# Patient Record
Sex: MALE | Race: WHITE | NOT HISPANIC OR LATINO | Employment: OTHER | URBAN - METROPOLITAN AREA
[De-identification: names, ages, dates, MRNs, and addresses within clinical notes are randomized per-mention and may not be internally consistent; named-entity substitution may affect disease eponyms.]

---

## 2021-01-22 ENCOUNTER — TRANSFERRED RECORDS (OUTPATIENT)
Dept: HEALTH INFORMATION MANAGEMENT | Facility: CLINIC | Age: 81
End: 2021-01-22

## 2021-01-26 NOTE — PROGRESS NOTES
Ordering Clinic: [facility]  Procedure: neph tube placement, left  Arrival date: 2/1/21  Arrival time: 800  Covid-19 testing requirements explained: yes  NPO explained: yes                Does patient have transportation available pre and post procedure?   yes  Check-in procedure explained: yes  Allergies reviewed: y  Blood thinners: eliquis to be held 48 hours  Labs: need  H&P:  With order  Does patient require ?   no  If so, language?      services called to order ?    date requested:    arrival time requested:    Name of individual from  services assisting with scheduling appointment:    Previous sedation:  Last oral intake:    solid: ________  Liquids: _______

## 2021-01-27 DIAGNOSIS — Z11.59 ENCOUNTER FOR SCREENING FOR OTHER VIRAL DISEASES: Primary | ICD-10-CM

## 2021-02-05 ENCOUNTER — HOSPITAL ENCOUNTER (OUTPATIENT)
Dept: LAB | Facility: CLINIC | Age: 81
Discharge: HOME | End: 2021-02-05
Attending: UROLOGY | Admitting: UROLOGY
Payer: COMMERCIAL

## 2021-02-05 DIAGNOSIS — Z11.59 ENCOUNTER FOR SCREENING FOR OTHER VIRAL DISEASES: ICD-10-CM

## 2021-02-05 LAB
SARS-COV-2 RNA RESP QL NAA+PROBE: NORMAL
SPECIMEN SOURCE: NORMAL

## 2021-02-05 PROCEDURE — U0003 INFECTIOUS AGENT DETECTION BY NUCLEIC ACID (DNA OR RNA); SEVERE ACUTE RESPIRATORY SYNDROME CORONAVIRUS 2 (SARS-COV-2) (CORONAVIRUS DISEASE [COVID-19]), AMPLIFIED PROBE TECHNIQUE, MAKING USE OF HIGH THROUGHPUT TECHNOLOGIES AS DESCRIBED BY CMS-2020-01-R: HCPCS | Performed by: UROLOGY

## 2021-02-05 PROCEDURE — U0005 INFEC AGEN DETEC AMPLI PROBE: HCPCS | Performed by: UROLOGY

## 2021-02-06 LAB
LABORATORY COMMENT REPORT: NORMAL
SARS-COV-2 RNA RESP QL NAA+PROBE: NEGATIVE
SPECIMEN SOURCE: NORMAL

## 2021-02-08 ENCOUNTER — APPOINTMENT (OUTPATIENT)
Dept: INTERVENTIONAL RADIOLOGY/VASCULAR | Facility: CLINIC | Age: 81
End: 2021-02-08
Attending: UROLOGY
Payer: COMMERCIAL

## 2021-02-08 ENCOUNTER — HOSPITAL ENCOUNTER (OUTPATIENT)
Facility: CLINIC | Age: 81
Discharge: HOME | End: 2021-02-08
Attending: RADIOLOGY | Admitting: RADIOLOGY
Payer: COMMERCIAL

## 2021-02-08 VITALS
HEART RATE: 103 BPM | OXYGEN SATURATION: 90 % | SYSTOLIC BLOOD PRESSURE: 118 MMHG | DIASTOLIC BLOOD PRESSURE: 68 MMHG | TEMPERATURE: 97.9 F | RESPIRATION RATE: 16 BRPM

## 2021-02-08 DIAGNOSIS — N13.5 STRICTURE OF URETER: ICD-10-CM

## 2021-02-08 LAB
BASOPHILS # BLD AUTO: 0 10E9/L (ref 0–0.2)
BASOPHILS NFR BLD AUTO: 0.2 %
CREAT SERPL-MCNC: 1.87 MG/DL (ref 0.66–1.25)
DIFFERENTIAL METHOD BLD: ABNORMAL
EOSINOPHIL # BLD AUTO: 0.5 10E9/L (ref 0–0.7)
EOSINOPHIL NFR BLD AUTO: 3.8 %
ERYTHROCYTE [DISTWIDTH] IN BLOOD BY AUTOMATED COUNT: 19.8 % (ref 10–15)
GFR SERPL CREATININE-BSD FRML MDRD: 33 ML/MIN/{1.73_M2}
HCT VFR BLD AUTO: 43.6 % (ref 40–53)
HGB BLD-MCNC: 12.9 G/DL (ref 13.3–17.7)
IMM GRANULOCYTES # BLD: 0.1 10E9/L (ref 0–0.4)
IMM GRANULOCYTES NFR BLD: 0.7 %
LYMPHOCYTES # BLD AUTO: 1.3 10E9/L (ref 0.8–5.3)
LYMPHOCYTES NFR BLD AUTO: 10.2 %
MCH RBC QN AUTO: 28.9 PG (ref 26.5–33)
MCHC RBC AUTO-ENTMCNC: 29.6 G/DL (ref 31.5–36.5)
MCV RBC AUTO: 98 FL (ref 78–100)
MONOCYTES # BLD AUTO: 1 10E9/L (ref 0–1.3)
MONOCYTES NFR BLD AUTO: 8 %
NEUTROPHILS # BLD AUTO: 10 10E9/L (ref 1.6–8.3)
NEUTROPHILS NFR BLD AUTO: 77.1 %
NRBC # BLD AUTO: 0 10*3/UL
NRBC BLD AUTO-RTO: 0 /100
PLATELET # BLD AUTO: 443 10E9/L (ref 150–450)
RBC # BLD AUTO: 4.47 10E12/L (ref 4.4–5.9)
WBC # BLD AUTO: 12.9 10E9/L (ref 4–11)

## 2021-02-08 PROCEDURE — 50432 PLMT NEPHROSTOMY CATHETER: CPT

## 2021-02-08 PROCEDURE — 85730 THROMBOPLASTIN TIME PARTIAL: CPT | Performed by: RADIOLOGY

## 2021-02-08 PROCEDURE — 272N000508 HC NEEDLE CR8

## 2021-02-08 PROCEDURE — C1729 CATH, DRAINAGE: HCPCS

## 2021-02-08 PROCEDURE — 250N000009 HC RX 250: Performed by: RADIOLOGY

## 2021-02-08 PROCEDURE — 272N000504 HC NEEDLE CR4

## 2021-02-08 PROCEDURE — C1769 GUIDE WIRE: HCPCS

## 2021-02-08 PROCEDURE — 85025 COMPLETE CBC W/AUTO DIFF WBC: CPT | Performed by: RADIOLOGY

## 2021-02-08 PROCEDURE — 999N000127 HC STATISTIC PERIPHERAL IV START W US GUIDANCE

## 2021-02-08 PROCEDURE — 250N000011 HC RX IP 250 OP 636: Performed by: RADIOLOGY

## 2021-02-08 PROCEDURE — 76937 US GUIDE VASCULAR ACCESS: CPT

## 2021-02-08 PROCEDURE — 36592 COLLECT BLOOD FROM PICC: CPT

## 2021-02-08 PROCEDURE — 272N000196 HC ACCESSORY CR5

## 2021-02-08 PROCEDURE — 82565 ASSAY OF CREATININE: CPT | Performed by: RADIOLOGY

## 2021-02-08 PROCEDURE — 50430 NJX PX NFROSGRM &/URTRGRM: CPT

## 2021-02-08 PROCEDURE — 250N000011 HC RX IP 250 OP 636

## 2021-02-08 PROCEDURE — 99152 MOD SED SAME PHYS/QHP 5/>YRS: CPT

## 2021-02-08 RX ORDER — LIDOCAINE 40 MG/G
CREAM TOPICAL
Status: DISCONTINUED | OUTPATIENT
Start: 2021-02-08 | End: 2021-02-08 | Stop reason: HOSPADM

## 2021-02-08 RX ORDER — CEFTAZIDIME 1 G/1
1 INJECTION, POWDER, FOR SOLUTION INTRAMUSCULAR; INTRAVENOUS
Status: COMPLETED | OUTPATIENT
Start: 2021-02-08 | End: 2021-02-08

## 2021-02-08 RX ORDER — FENTANYL CITRATE 50 UG/ML
25-50 INJECTION, SOLUTION INTRAMUSCULAR; INTRAVENOUS EVERY 5 MIN PRN
Status: DISCONTINUED | OUTPATIENT
Start: 2021-02-08 | End: 2021-02-08 | Stop reason: HOSPADM

## 2021-02-08 RX ORDER — LIDOCAINE HYDROCHLORIDE 10 MG/ML
1-30 INJECTION, SOLUTION EPIDURAL; INFILTRATION; INTRACAUDAL; PERINEURAL
Status: DISCONTINUED | OUTPATIENT
Start: 2021-02-08 | End: 2021-02-08 | Stop reason: HOSPADM

## 2021-02-08 RX ORDER — DEXTROSE MONOHYDRATE 25 G/50ML
25-50 INJECTION, SOLUTION INTRAVENOUS
Status: CANCELLED | OUTPATIENT
Start: 2021-02-08

## 2021-02-08 RX ORDER — NICOTINE POLACRILEX 4 MG
15-30 LOZENGE BUCCAL
Status: DISCONTINUED | OUTPATIENT
Start: 2021-02-08 | End: 2021-02-08 | Stop reason: HOSPADM

## 2021-02-08 RX ORDER — FENTANYL CITRATE 50 UG/ML
INJECTION, SOLUTION INTRAMUSCULAR; INTRAVENOUS
Status: COMPLETED
Start: 2021-02-08 | End: 2021-02-08

## 2021-02-08 RX ORDER — FLUMAZENIL 0.1 MG/ML
0.2 INJECTION, SOLUTION INTRAVENOUS
Status: DISCONTINUED | OUTPATIENT
Start: 2021-02-08 | End: 2021-02-08 | Stop reason: HOSPADM

## 2021-02-08 RX ORDER — DEXTROSE MONOHYDRATE 25 G/50ML
25-50 INJECTION, SOLUTION INTRAVENOUS
Status: DISCONTINUED | OUTPATIENT
Start: 2021-02-08 | End: 2021-02-08 | Stop reason: HOSPADM

## 2021-02-08 RX ORDER — NALOXONE HYDROCHLORIDE 0.4 MG/ML
0.2 INJECTION, SOLUTION INTRAMUSCULAR; INTRAVENOUS; SUBCUTANEOUS
Status: DISCONTINUED | OUTPATIENT
Start: 2021-02-08 | End: 2021-02-08 | Stop reason: HOSPADM

## 2021-02-08 RX ORDER — NICOTINE POLACRILEX 4 MG
15-30 LOZENGE BUCCAL
Status: CANCELLED | OUTPATIENT
Start: 2021-02-08

## 2021-02-08 RX ORDER — NALOXONE HYDROCHLORIDE 0.4 MG/ML
0.4 INJECTION, SOLUTION INTRAMUSCULAR; INTRAVENOUS; SUBCUTANEOUS
Status: DISCONTINUED | OUTPATIENT
Start: 2021-02-08 | End: 2021-02-08 | Stop reason: HOSPADM

## 2021-02-08 RX ORDER — CEFTAZIDIME 1 G/1
INJECTION, POWDER, FOR SOLUTION INTRAMUSCULAR; INTRAVENOUS
Status: COMPLETED
Start: 2021-02-08 | End: 2021-02-08

## 2021-02-08 RX ORDER — LIDOCAINE HYDROCHLORIDE 10 MG/ML
INJECTION, SOLUTION EPIDURAL; INFILTRATION; INTRACAUDAL; PERINEURAL
Status: DISCONTINUED
Start: 2021-02-08 | End: 2021-02-08 | Stop reason: HOSPADM

## 2021-02-08 RX ADMIN — FENTANYL CITRATE 25 MCG: 50 INJECTION, SOLUTION INTRAMUSCULAR; INTRAVENOUS at 11:19

## 2021-02-08 RX ADMIN — LIDOCAINE HYDROCHLORIDE 12 ML: 10 INJECTION, SOLUTION EPIDURAL; INFILTRATION; INTRACAUDAL; PERINEURAL at 11:36

## 2021-02-08 RX ADMIN — CEFTAZIDIME 1 G: 1 INJECTION, POWDER, FOR SOLUTION INTRAMUSCULAR; INTRAVENOUS at 11:07

## 2021-02-08 RX ADMIN — MIDAZOLAM 1 MG: 1 INJECTION INTRAMUSCULAR; INTRAVENOUS at 11:19

## 2021-02-08 RX ADMIN — CEFTAZIDIME 1 G: 1 INJECTION, POWDER, FOR SOLUTION INTRAMUSCULAR; INTRAVENOUS at 10:42

## 2021-02-08 NOTE — DISCHARGE INSTRUCTIONS
Discharge Instructions for Percutaneous Nephrostomy  You had a procedure called percutaneous nephrostomy. This means that urine was drained from your kidney to prevent pain, infection, and kidney damage. You had the procedure because your kidney or the tube leading from the kidney to the bladder (ureter) was blocked by a kidney stone or tumor, or perhaps due to another problem. The blockage caused a backup of urine in your kidney.  A thin, flexible tube (catheter) will stay in place until the problem that caused the buildup of urine has been treated. This may be as soon as a day or as long as weeks to months. The catheter bag is taped to your leg so that you can walk around.  Activity    Don t lift anything heavier than  10 pounds (4.5 kg) until your healthcare provider says it s OK.    Don't do any strenuous activities, such as mowing the lawn, vacuuming, playing sports, or anything that will cause your tubing to be pulled or moved.    Slowly increase your activity level with short, frequent walks  3 to 4 times a day.    Don t drive while you are still taking pain medicine. Wait until your healthcare provider says it s OK to drive.    Home care    Eat your normal diet.    Drink 8 to 12 (8-ounce) cups of liquid each day unless you were told to limit liquids because of another condition. About 30 to 60 milliliters of urine should drain into the bag each hour.    Wear loose, comfortable clothes that won t pull or kink the catheter tube.    Check your dressing often to make sure the tubing is secure.    Don t let the drainage bag hang freely, or it will pull on the catheter. Keep it secured to your leg or hold it temporarily.    Empty the drainage bag often to keep the weight of the bag from pulling on the catheter.  ? Empty the bag when it is 1/2 to 2/3 full.  ? Always empty the bag before you go to bed.  ? Wash your hands before and after emptying the bag.    Measure and record the amount and color of the urine in  the bag.    Gently clean the skin around the catheter with mild soap and warm water. Check the skin for any signs of infection. Pat dry with a clean towel.    Change your dressing if it becomes loose or dirty. Ask your healthcare provider how your skin should be cleaned and which skin barriers and attachment devices to use. Ask someone to help you care for your nephrostomy tube and follow the cleaning instructions.    Throw away the used dressing in a plastic bag.    If you were asked to stop any medicines before the surgery, ask the healthcare provider when you may start taking them again. This is especially important in the case of blood thinners (anticoagulants or antiplatelet medicines).    Follow-up care  Follow up with your healthcare provider, or as advised.  When to call your healthcare provider  Call your healthcare provider right away if you have any of these:    A catheter that is not draining    The catheter comes out. Don't try to put it back in.    The catheter partly comes out. There may be a black celia on the tube to celia the place where the tube enters the skin. Check to see that the black celia is next to the skin. If the black celia isn't next to the skin, the tube has moved. A healthcare provider needs to put it back in. Don't try to put it back in.    Pain, redness, or discharge around the catheter    Fever of  100.4 F ( 38 C) or higher, or as directed by your healthcare provider    A noticeable increase or decrease in the amount of urine that drains    Cloudy or smelly urine    Urine that changes to a pink or red color    More pain    Severe pain in your side    Nausea and vomiting    New or worsening symptoms  [employer] last reviewed this educational content on 4/1/2020 2000-2020 [employer]. [address] [place] [address], [place] [address]. All rights reserved. This information is not intended as a substitute for professional medical care. Always follow your healthcare professional's  instructions.        Invasive Radiology Procedures Discharge Instructions         _____________________________________________________________________    Patient Name:  Rogelio Trejo  Today's Date:  February 8, 2021    The doctor who did your procedure today was Dr. Emerson.    Procedure:  Nephrostomy Tube       If you have not received your results after 5 days, please call the doctor who ordered your test.  Diet and medicines    Go back to your normal diet.    You may start taking your normal medicines again (including Coumadin, or warfarin), as shown on your medicine sheet.    If you take aspirin, Plavix or other anti-platelet drugs: Start taking it tomorrow.    If take Coumadin (warfarin): Ask your doctor when to have your INR checked.    For minor pain, you may take Tylenol (acetaminophen) or Advil (ibuprofen).    Activity and puncture site     You may go back to normal activity in 24 hours. Wait 48 hours before lifting,   straining, exercise or other strenuous activity.    For the next day or two, check your puncture site often while you are awake.    Change the Band-Aid or bandage tomorrow.    You may shower tomorrow morning. No bathing or swimming until your   puncture site has fully healed.    If you received IV medicine to sedate you:  You were given: Versed  and Fentanyl  You may feel drowsy, forgetful or unsteady. For the next 24 hours, do not drive, drink alcohol or make any important decisions.    Know when to call for help  Call your doctor if you have:    A fever greater over 101 F (38.3 C), taken under the tongue.    A lot of bleeding or swelling at your puncture site.    Pain that is getting worse.     Shortness of breath.  Call 911 or go the emergency room if you have:    Severe chest pain or trouble breathing.    A tube that falls out.     Increased blood in your sputum (phlegm).    Bleeding that you cannot control.   Important phone numbers:  [facility] at [phone]

## 2021-02-08 NOTE — PRE-PROCEDURE
GENERAL PRE-PROCEDURE:   Procedure:  Left nephrostomy tube placement.   Date/Time:  2/8/2021 9:41 AM    Verbal consent obtained?: Yes    Written consent obtained?: Yes    Risks and benefits: Risks, benefits and alternatives were discussed    Consent given by:  Patient  Patient states understanding of procedure being performed: Yes    Patient's understanding of procedure matches consent: Yes    Procedure consent matches procedure scheduled: Yes    Expected level of sedation:  Moderate  Appropriately NPO:  Yes  ASA Class:  Class 2- mild systemic disease, no acute problems, no functional limitations  Mallampati  :  Grade 2- soft palate, base of uvula, tonsillar pillars, and portion of posterior pharyngeal wall visible  Lungs:  Lungs clear with good breath sounds bilaterally  Heart:  Normal heart sounds and rate  History & Physical reviewed:  History and physical reviewed and no updates needed  Statement of review:  I have reviewed the lab findings, diagnostic data, medications, and the plan for sedation

## 2021-02-08 NOTE — SEDATION DOCUMENTATION
Post Procedure Summary:  Prior to the start of the procedure and with procedural staff participation, I verbally confirmed the patient s identity using two indicators, relevant allergies, that the procedure was appropriate and matched the consent or emergent situation, and that the correct equipment/implants were available. Immediately prior to starting the procedure I conducted the Time Out with the procedural staff and re-confirmed the patient s name, procedure, and site/side. (The Joint Commission universal protocol was followed.)  Yes       Sedatives: Fentanyl and Midazolam (Versed)    Vital signs, airway and pulse oximetry were monitored and remained stable throughout the procedure and sedation was maintained until the procedure was complete.  The patient was monitored by staff until sedation discharge criteria were met. Pt's IV was infiltrated upon arrival to OR. MD started central line in room for abx and sedation.     Patient tolerance: Patient tolerated the procedure well with no immediate complications.    Time of sedation in minutes: 20 minutes from beginning to end of physician one to one monitoring.

## 2021-02-08 NOTE — IP AVS SNAPSHOT
[facility]  [address]  [facility] [address]  Phone: [phone]                                    After Visit Summary   2/8/2021    Rogelio Trejo    MRN: [id number]           After Visit Summary Signature Page    I have received my discharge instructions, and my questions have been answered. I have discussed any challenges I see with this plan with the nurse or doctor.    ..........................................................................................................................................  Patient/Patient Representative Signature      ..........................................................................................................................................  Patient Representative Print Name and Relationship to Patient    ..................................................               ................................................  Date                                   Time    ..........................................................................................................................................  Reviewed by Signature/Title    ...................................................              ..............................................  Date                                               Time          22EPIC Rev 08/18

## 2021-02-08 NOTE — PROCEDURES
[facility]    Procedure: Left nephrostomy tube placement.     Date/Time: 2/8/2021 11:41 AM  Performed by: Cheryl Emerson DO  Authorized by: Cheryl Emerson DO     [employer]   Site Marked: Yes  Prior Images Obtained and Reviewed:  Yes  Required items: Required blood products, implants, devices and special equipment available    Patient identity confirmed:  Verbally with patient, arm band, provided demographic data and hospital-assigned identification number  Patient was reevaluated immediately before administering moderate or deep sedation or anesthesia  Confirmation Checklist:  Patient's identity using two indicators, relevant allergies, procedure was appropriate and matched the consent or emergent situation and correct equipment/implants were available  Time out: Immediately prior to the procedure a time out was called    Universal Protocol: the Joint Commission Universal Protocol was followed    Preparation: Patient was prepped and draped in usual sterile fashion           ANESTHESIA    Anesthesia: Local infiltration  Local Anesthetic:  Lidocaine 1% without epinephrine      SEDATION    Patient Sedated: Yes    Sedation Type:  Moderate (conscious) sedation  Vital signs: Vital signs monitored during sedation    See dictated procedure note for full details.  Findings: Left nephrostomy tube placement.     Specimens: none    Complications: None    Condition: Stable    PROCEDURE   Patient Tolerance:  Patient tolerated the procedure well with no immediate complications    Length of time physician/provider present for 1:1 monitoring during sedation: 20

## 2021-02-08 NOTE — IP AVS SNAPSHOT
MRN:[id number]                      After Visit Summary   2/8/2021    Rogelio Trejo    MRN: [id number]           Visit Information        Department      2/8/2021  8:41 AM [facility]          Review of your medicines      You have not been prescribed any medications.           Protect others around you: Learn how to safely use, store and throw away your medicines at www.disposemymeds.org.       Follow-ups after your visit       Your next 10 appointments already scheduled    Feb 08, 2021 10:00 AM  IR NEPHROSTOMY TUBE PLACEMENT LEFT with RHIR11, CATHIRTEAM, RH IR RAD  [facility] Interventional [facility]iology ([facility] ) [address] [facility] [address] [phone]   The day before the exam:    You may eat your regular diet.    You are encouraged to drink at least 8 eight ounce glasses of clear liquids.    Drink no alcoholic beverages for 24 hours before or after the exam.    The day of the exam:    Do not eat any solid food or milk products for 6 hours prior to the exam. You may drink clear liquids until 2 hours prior to the exam. Clear liquids include the following: water, Jell-O, clear broth, apple juice or any non-carbonated drink that you can see through (no pop!)    The morning of the exam you may take medications as directed with a sip of water.    Please wear loose clothing, such as a sweat suit or jogging clothes. Avoid snaps, zippers and other metal. We may ask you to undress and put on a hospital gown.    Please bring any scans or X-rays taken at other hospitals, if similar tests were done. Also bring a list of your medicines, including vitamins, minerals and over-the-counter drugs. It is safest to leave personal items at home.    Someone will need to drive you to and from the hospital.    Tell your doctor in advance:    If you have allergies to x-ray contrast or iodine.    If you are or may be pregnant.    If you are  diabetic to determine if your insulin needs have to be adjusted for the exam.    If you were given sedation, you cannot drive for 24 hours after the procedure, and an adult must be with you until then.    If you have any questions, please call the Imaging Department where you will have your exam. Directions, parking instructions, and other information are available on our website, [email or web address].        Care Instructions       Further instructions from your care team         Discharge Instructions for Percutaneous Nephrostomy  You had a procedure called percutaneous nephrostomy. This means that urine was drained from your kidney to prevent pain, infection, and kidney damage. You had the procedure because your kidney or the tube leading from the kidney to the bladder (ureter) was blocked by a kidney stone or tumor, or perhaps due to another problem. The blockage caused a backup of urine in your kidney.  A thin, flexible tube (catheter) will stay in place until the problem that caused the buildup of urine has been treated. This may be as soon as a day or as long as weeks to months. The catheter bag is taped to your leg so that you can walk around.  Activity    Don t lift anything heavier than  10 pounds (4.5 kg) until your healthcare provider says it s OK.    Don't do any strenuous activities, such as mowing the lawn, vacuuming, playing sports, or anything that will cause your tubing to be pulled or moved.    Slowly increase your activity level with short, frequent walks  3 to 4 times a day.    Don t drive while you are still taking pain medicine. Wait until your healthcare provider says it s OK to drive.    Home care    Eat your normal diet.    Drink 8 to 12 (8-ounce) cups of liquid each day unless you were told to limit liquids because of another condition. About 30 to 60 milliliters of urine should drain into the bag each hour.    Wear loose, comfortable clothes that won t pull or kink the catheter  tube.    Check your dressing often to make sure the tubing is secure.    Don t let the drainage bag hang freely, or it will pull on the catheter. Keep it secured to your leg or hold it temporarily.    Empty the drainage bag often to keep the weight of the bag from pulling on the catheter.  ? Empty the bag when it is 1/2 to 2/3 full.  ? Always empty the bag before you go to bed.  ? Wash your hands before and after emptying the bag.    Measure and record the amount and color of the urine in the bag.    Gently clean the skin around the catheter with mild soap and warm water. Check the skin for any signs of infection. Pat dry with a clean towel.    Change your dressing if it becomes loose or dirty. Ask your healthcare provider how your skin should be cleaned and which skin barriers and attachment devices to use. Ask someone to help you care for your nephrostomy tube and follow the cleaning instructions.    Throw away the used dressing in a plastic bag.    If you were asked to stop any medicines before the surgery, ask the healthcare provider when you may start taking them again. This is especially important in the case of blood thinners (anticoagulants or antiplatelet medicines).    Follow-up care  Follow up with your healthcare provider, or as advised.  When to call your healthcare provider  Call your healthcare provider right away if you have any of these:    A catheter that is not draining    The catheter comes out. Don't try to put it back in.    The catheter partly comes out. There may be a black celia on the tube to celia the place where the tube enters the skin. Check to see that the black celia is next to the skin. If the black celia isn't next to the skin, the tube has moved. A healthcare provider needs to put it back in. Don't try to put it back in.    Pain, redness, or discharge around the catheter    Fever of  100.4 F ( 38 C) or higher, or as directed by your healthcare provider    A noticeable increase or  decrease in the amount of urine that drains    Cloudy or smelly urine    Urine that changes to a pink or red color    More pain    Severe pain in your side    Nausea and vomiting    New or worsening symptoms  [employer] last reviewed this educational content on 4/1/2020 2000-2020 [employer]. [address] [place] [address]. All rights reserved. This information is not intended as a substitute for professional medical care. Always follow your healthcare professional's instructions.        Invasive Radiology Procedures Discharge Instructions         _____________________________________________________________________    Patient Name:  Rogelio Trejo  Today's Date:  February 8, 2021    The doctor who did your procedure today was Dr. Emerson.    Procedure:  Nephrostomy Tube       If you have not received your results after 5 days, please call the doctor who ordered your test.  Diet and medicines    Go back to your normal diet.    You may start taking your normal medicines again (including Coumadin, or warfarin), as shown on your medicine sheet.    If you take aspirin, Plavix or other anti-platelet drugs: Start taking it tomorrow.    If take Coumadin (warfarin): Ask your doctor when to have your INR checked.    For minor pain, you may take Tylenol (acetaminophen) or Advil (ibuprofen).    Activity and puncture site     You may go back to normal activity in 24 hours. Wait 48 hours before lifting,   straining, exercise or other strenuous activity.    For the next day or two, check your puncture site often while you are awake.    Change the Band-Aid or bandage tomorrow.    You may shower tomorrow morning. No bathing or swimming until your   puncture site has fully healed.    If you received IV medicine to sedate you:  You were given: Versed  and Fentanyl  You may feel drowsy, forgetful or unsteady. For the next 24 hours, do not drive, drink alcohol or make any important decisions.    Know when to  "call for help  Call your doctor if you have:    A fever greater over 101 F (38.3 C), taken under the tongue.    A lot of bleeding or swelling at your puncture site.    Pain that is getting worse.     Shortness of breath.  Call 911 or go the emergency room if you have:    Severe chest pain or trouble breathing.    A tube that falls out.     Increased blood in your sputum (phlegm).    Bleeding that you cannot control.   Important phone numbers:  [facility] at [phone]    Additional Information About Your Visit       [employer] Information    [employer] lets you send messages to your doctor, view your test results, renew your prescriptions, schedule appointments and more. To sign up, go to [email or web address] . Click on \"Log in\" on the left side of the screen, which will take you to the Welcome page. Then click on \"Sign up Now\" on the right side of the page.     You will be asked to enter the access code listed below, as well as some personal information. Please follow the directions to create your username and password.     Your access code is: [email or web address]  Expires: 2021  9:39 AM     Your access code will  in 60 days. If you need help or a new code, please call your   [facility] clinic or [phone].       Care EveryWhere ID    This is your Care EveryWhere ID. This could be used by other organizations to access your [facility] medical records  [id number]        Primary Care Provider    Nicholas Price      Equal Access to Services    ALEJANDRINA JOHNSON : Hadii beth ku hadasho Soomaali, waaxda luqadaha, qaybta kaalmada adeegyada, alex bolanos . So [facility] [phone].    ATENCIÓN: Si habla español, tiene a manning disposición servicios gratuitos de asistencia lingüística. Llame al [phone].    We comply with applicable federal and state civil rights laws, including the [place] Human Rights Act. We do not discriminate on the basis of race, color, creed, " Buddhist, national origin, marital status, age, disability, sex, sexual orientation, or gender identity.    If you would like an itemization of your charges they will now be available in [facility] 30 days after discharge. To access the itemized statements in [facility] go to billing/billing summary. From there select view account. There will be multiple tabs showing an overview of your account, detail, payments, and communications. From the communications tab you can see your monthly statements, your itemized statements, and any billing letters generated for your account. If you do not have a [facility] account and need help getting access please contact [facility] support at [phone].  If you would prefer to have your itemized statements mailed please contact our automated itemized bill request line at [phone] option  2.       Thank you!    Thank you for choosing [facility] for your care. Our goal is always to provide you with excellent care. Hearing back from our patients is one way we can continue to improve our services. Please take a few minutes to complete the written survey that you may receive in the mail after you visit. If you would like to speak to someone directly about your visit please contact Patient Relations at [phone]. Thank you!           Medication List    You have not been prescribed any medications.

## 2021-03-16 ENCOUNTER — MEDICAL CORRESPONDENCE (OUTPATIENT)
Dept: HEALTH INFORMATION MANAGEMENT | Facility: CLINIC | Age: 81
End: 2021-03-16

## 2021-04-27 DIAGNOSIS — Z11.59 ENCOUNTER FOR SCREENING FOR OTHER VIRAL DISEASES: ICD-10-CM

## 2021-05-07 DIAGNOSIS — Z11.59 ENCOUNTER FOR SCREENING FOR OTHER VIRAL DISEASES: ICD-10-CM

## 2021-05-07 LAB
SARS-COV-2 RNA RESP QL NAA+PROBE: NORMAL
SPECIMEN SOURCE: NORMAL

## 2021-05-07 PROCEDURE — U0005 INFEC AGEN DETEC AMPLI PROBE: HCPCS | Performed by: UROLOGY

## 2021-05-07 PROCEDURE — U0003 INFECTIOUS AGENT DETECTION BY NUCLEIC ACID (DNA OR RNA); SEVERE ACUTE RESPIRATORY SYNDROME CORONAVIRUS 2 (SARS-COV-2) (CORONAVIRUS DISEASE [COVID-19]), AMPLIFIED PROBE TECHNIQUE, MAKING USE OF HIGH THROUGHPUT TECHNOLOGIES AS DESCRIBED BY CMS-2020-01-R: HCPCS | Performed by: UROLOGY

## 2021-05-10 ENCOUNTER — APPOINTMENT (OUTPATIENT)
Dept: INTERVENTIONAL RADIOLOGY/VASCULAR | Facility: CLINIC | Age: 81
End: 2021-05-10
Attending: UROLOGY
Payer: COMMERCIAL

## 2021-05-10 ENCOUNTER — HOSPITAL ENCOUNTER (OUTPATIENT)
Facility: CLINIC | Age: 81
Discharge: HOME | End: 2021-05-10
Attending: RADIOLOGY | Admitting: RADIOLOGY
Payer: COMMERCIAL

## 2021-05-10 VITALS
SYSTOLIC BLOOD PRESSURE: 140 MMHG | TEMPERATURE: 97.6 F | OXYGEN SATURATION: 95 % | HEART RATE: 81 BPM | RESPIRATION RATE: 16 BRPM | DIASTOLIC BLOOD PRESSURE: 75 MMHG

## 2021-05-10 DIAGNOSIS — N18.6 ESRD (END STAGE RENAL DISEASE) (H): ICD-10-CM

## 2021-05-10 PROCEDURE — 250N000009 HC RX 250

## 2021-05-10 PROCEDURE — 250N000011 HC RX IP 250 OP 636

## 2021-05-10 PROCEDURE — 50435 EXCHANGE NEPHROSTOMY CATH: CPT

## 2021-05-10 PROCEDURE — C1729 CATH, DRAINAGE: HCPCS

## 2021-05-10 PROCEDURE — C1769 GUIDE WIRE: HCPCS

## 2021-05-10 RX ORDER — CEFTAZIDIME 1 G/1
INJECTION, POWDER, FOR SOLUTION INTRAMUSCULAR; INTRAVENOUS
Status: COMPLETED
Start: 2021-05-10 | End: 2021-05-10

## 2021-05-10 RX ORDER — ATORVASTATIN CALCIUM 20 MG/1
20 TABLET, FILM COATED ORAL DAILY
COMMUNITY

## 2021-05-10 RX ORDER — HYDROCHLOROTHIAZIDE 12.5 MG/1
12.5 TABLET ORAL DAILY
COMMUNITY

## 2021-05-10 RX ORDER — GABAPENTIN 300 MG/1
300 CAPSULE ORAL 3 TIMES DAILY
COMMUNITY

## 2021-05-10 RX ORDER — LIDOCAINE 40 MG/G
CREAM TOPICAL
Status: DISCONTINUED | OUTPATIENT
Start: 2021-05-10 | End: 2021-05-10 | Stop reason: HOSPADM

## 2021-05-10 RX ORDER — CEFTAZIDIME 1 G/1
1 INJECTION, POWDER, FOR SOLUTION INTRAMUSCULAR; INTRAVENOUS
Status: COMPLETED | OUTPATIENT
Start: 2021-05-10 | End: 2021-05-10

## 2021-05-10 RX ORDER — LIDOCAINE HYDROCHLORIDE 10 MG/ML
INJECTION, SOLUTION EPIDURAL; INFILTRATION; INTRACAUDAL; PERINEURAL
Status: COMPLETED
Start: 2021-05-10 | End: 2021-05-10

## 2021-05-10 RX ADMIN — CEFTAZIDIME 1 G: 1 INJECTION, POWDER, FOR SOLUTION INTRAMUSCULAR; INTRAVENOUS at 11:38

## 2021-05-10 RX ADMIN — LIDOCAINE HYDROCHLORIDE 10 ML: 10 INJECTION, SOLUTION EPIDURAL; INFILTRATION; INTRACAUDAL; PERINEURAL at 11:48

## 2021-05-10 NOTE — DISCHARGE INSTRUCTIONS
Invasive Radiology Procedures Discharge Instructions    Nephrostomy Tube        The doctor who did your procedure today was Dr. Holley     Diet and medicines      Go back to your normal diet.    You may start taking your normal medicines again (including Coumadin, or warfarin),         as shown on your medicine sheet.     If you take aspirin, Plavix or other anti-platelet drugs: Start taking it tomorrow.     If take Coumadin (warfarin): Ask your doctor when to have your INR checked        For minor pain, you may take Tylenol (acetaminophen) or Advil (ibuprofen).    Activity and puncture site    You may go back to normal activity in 24 hours. Wait 48 hours before lifting,straining, exercise or other strenuous activity.    For the next day or two, check your puncture site often while you are awake.    Change the Band-Aid or bandage tomorrow.    You may shower tomorrow morning. No bathing or swimming until yourpuncture site has fully healed.     If you received IV medicine to sedate you: ___versed___fentanyl  You may feel drowsy, forgetful or unsteady. For the next 24 hours, do not drive,  drink alcohol or make any important decisions.    ??Know when to call for help  Call your doctor if you have:  -?A fever greater over 101 F (38.3 C), taken under the tongue.  -?A lot of bleeding or swelling at your puncture site.  -?Pain that is getting worse.  -?Shortness of breath.  Call 911 or go the emergency room if you have:  -?Severe chest pain or trouble breathing.  -?A tube that falls out.  -?Increased blood in your sputum (phlegm).  -?Bleeding that you cannot control.  Important phone numbers:  ??[facility] ..................................................................... [phone]  ??[facility] ........................................................... [phone]  ??[facility] ................................................................  [phone]  ??[facility].............. [phone]  X______________________________ __________________________ ____________  Person receiving instructions Instructor Date/Time

## 2021-05-10 NOTE — IP AVS SNAPSHOT
After Visit Summary Template Not Found    This Print Group is only intended to be used in the After Visit Summary and can only be used in a report that uses a released After Visit Summary Template.                       MRN:[id number]                      After Visit Summary   5/10/2021    Rogelio Trejo    MRN: [id number]           Visit Information        Department      5/10/2021  8:50 AM [facility] Interventional Radiology          Review of your medicines      UNREVIEWED medicines. Ask your doctor about these medicines       Dose / Directions   apixaban ANTICOAGULANT 5 MG tablet  Commonly known as: ELIQUIS      Dose: 5 mg  Take 5 mg by mouth 2 times daily  Refills: 0     atorvastatin 20 MG tablet  Commonly known as: LIPITOR      Dose: 20 mg  Take 20 mg by mouth daily  Refills: 0     gabapentin 300 MG capsule  Commonly known as: NEURONTIN      Dose: 300 mg  Take 300 mg by mouth 3 times daily  Refills: 0     hydrochlorothiazide 12.5 MG tablet  Commonly known as: HYDRODIURIL      Dose: 12.5 mg  Take 12.5 mg by mouth daily  Refills: 0              Protect others around you: Learn how to safely use, store and throw away your medicines at www.disposemymeds.org.       Follow-ups after your visit       Your next 10 appointments already scheduled    May 10, 2021 10:00 AM  IR NEPHROSTOMY TUBE CHANGE LEFT with JUSTIN LOVELACE  [facility] Interventional Radiology ([facility] ) [address] [facility] [id number] [phone]   How do I prepare for my exam? (Food and drink instructions)  Adults and Children over 2 years old: No eating for 8 hours before your procedure. You may have clear liquids up until 2 hours beforehand. These include water, clear tea, black coffee and fruit juice without pulp.  Children under 2 years old:  No eating or formula for 6 hours before your procedure. You may have clear liquids up until 2 hours beforehand. No breast milk for 4  hours before your procedure.    How do I prepare for my exam? (Other instructions)  We will call you to talk about your procedure and answer your questions. We will tell you what time to arrive. We will also ask about any medicines you are taking.  You will need to have a history and physical within 30 days before this procedure.    What should I wear: Please wear loose clothing, such as a sweat suit or jogging clothes. You will be asked to undress and put on a hospital gown.    How long does the exam take: You should expect to be at the facility for approximately 4 hours    What should I bring: Please bring any scans or X-rays taken at other hospitals, if similar tests were done. Also bring a list of your medicines, including vitamins, minerals and over-the-counter drugs. It is safest to leave personal items at home.    Do I need a :  Yes, you may not drive or take a bus or taxi by yourself. You will need an adult to take you home. It is recommended that a responsible adult remain with you for 6 hours.    What do I need to tell my doctor:  Tell your doctor if:  * You have ever had an allergic reaction to X-ray dye (contrast fluid).  * If there's any chance you are pregnant.    What should I do after the exam:  Rest and do quiet activities for 24 hours. Avoid any heavy activity (lifting, vacuuming, exercise) on the day of the exam.  Do not make any major decisions and ensure you have a responsible adult with you for the remainder of the day.   Do not drive or use dangerous machines or tools for 24 hours.  Eat small, frequent meals to prevent nausea and vomiting.   Drink liquids as directed. Do not drink alcohol or take medicines that make you drowsy.  You should be able to return to your everyday activities the next day.    Who should I call with questions: If you have any questions, please call the Imaging Department where you will have your exam. Directions, parking instructions, and other information are  available on our website, [email or web address].        Care Instructions       Further instructions from your care team       Invasive Radiology Procedures Discharge Instructions    Nephrostomy Tube        The doctor who did your procedure today was Dr. Holley     Diet and medicines      Go back to your normal diet.    You may start taking your normal medicines again (including Coumadin, or warfarin),         as shown on your medicine sheet.     If you take aspirin, Plavix or other anti-platelet drugs: Start taking it tomorrow.     If take Coumadin (warfarin): Ask your doctor when to have your INR checked        For minor pain, you may take Tylenol (acetaminophen) or Advil (ibuprofen).    Activity and puncture site    You may go back to normal activity in 24 hours. Wait 48 hours before lifting,straining, exercise or other strenuous activity.    For the next day or two, check your puncture site often while you are awake.    Change the Band-Aid or bandage tomorrow.    You may shower tomorrow morning. No bathing or swimming until yourpuncture site has fully healed.     If you received IV medicine to sedate you: ___versed___fentanyl  You may feel drowsy, forgetful or unsteady. For the next 24 hours, do not drive,  drink alcohol or make any important decisions.    ??Know when to call for help  Call your doctor if you have:  -?A fever greater over 101 F (38.3 C), taken under the tongue.  -?A lot of bleeding or swelling at your puncture site.  -?Pain that is getting worse.  -?Shortness of breath.  Call 911 or go the emergency room if you have:  -?Severe chest pain or trouble breathing.  -?A tube that falls out.  -?Increased blood in your sputum (phlegm).  -?Bleeding that you cannot control.  Important phone numbers:  ??[facility] ..................................................................... [phone]  ??[facility] ...........................................................  "[phone]  ??[facility] ................................................................ [phone]  ??[facility].............. [phone]  X______________________________ __________________________ ____________  Person receiving instructions Instructor Date/Time    Additional Information About Your Visit       MyChart Information    [employer] lets you send messages to your doctor, view your test results, renew your prescriptions, schedule appointments and more. To sign up, go to [email or web address] . Click on \"Log in\" on the left side of the screen, which will take you to the Welcome page. Then click on \"Sign up Now\" on the right side of the page.     You will be asked to enter the access code listed below, as well as some personal information. Please follow the directions to create your username and password.     Your access code is: [email or web address]  Expires: 2021  9:47 AM     Your access code will  in 60 days. If you need help or a new code, please call your   [facility] clinic or [phone].       Care EveryWhere ID    This is your Care EveryWhere ID. This could be used by other organizations to access your [place] medical records  [id number]        Primary Care Provider    Nicholsa Price      Equal Access to Services    TONY JOHNSON : Ga Corado, mayte mcneal, alex castellanos. [facility] [phone].    ATENCIÓN: Si habla krystin, tiene a manning disposición servicios gratuitos de asistencia lingüística. Llame al [phone].    We comply with applicable federal and state civil rights laws, including the [place] Human Rights Act. We do not discriminate on the basis of race, color, creed, Lutheran, national origin, marital status, age, disability, sex, sexual orientation, or gender identity.    If you would like an itemization of your charges " they will now be available in [employer]iling 30 days after discharge. To access the itemized statements in [employer]iling go to billing/billing summary. From there select view account. There will be multiple tabs showing an overview of your account, detail, payments, and communications. From the communications tab you can see your monthly statements, your itemized statements, and any billing letters generated for your account. If you do not have a [employer] account and need help getting access please contact [employer] support at [phone].  If you would prefer to have your itemized statements mailed please contact our automated itemized bill request line at [phone] option  2.       Thank you!    Thank you for choosing [facility] [place] for your care. Our goal is always to provide you with excellent care. Hearing back from our patients is one way we can continue to improve our services. Please take a few minutes to complete the written survey that you may receive in the mail after you visit. If you would like to speak to someone directly about your visit please contact Patient Relations at [phone]. Thank you!              Medication List      ASK your doctor about these medications          Morning Afternoon Evening Bedtime As Needed    apixaban ANTICOAGULANT 5 MG tablet  Also known as: ELIQUIS  INSTRUCTIONS: Take 5 mg by mouth 2 times daily                     atorvastatin 20 MG tablet  Also known as: LIPITOR  INSTRUCTIONS: Take 20 mg by mouth daily                     gabapentin 300 MG capsule  Also known as: NEURONTIN  INSTRUCTIONS: Take 300 mg by mouth 3 times daily                     hydrochlorothiazide 12.5 MG tablet  Also known as: HYDRODIURIL  INSTRUCTIONS: Take 12.5 mg by mouth daily

## 2021-05-10 NOTE — IP AVS SNAPSHOT
[facility] Interventional Radiology  [address] [facility] [address]  Phone: [phone]  Fax: [phone]                                    After Visit Summary   5/10/2021    Rogelio Trejo    MRN: [id number]           After Visit Summary Signature Page    I have received my discharge instructions, and my questions have been answered. I have discussed any challenges I see with this plan with the nurse or doctor.    ..........................................................................................................................................  Patient/Patient Representative Signature      ..........................................................................................................................................  Patient Representative Print Name and Relationship to Patient    ..................................................               ................................................  Date                                   Time    ..........................................................................................................................................  Reviewed by Signature/Title    ...................................................              ..............................................  Date                                               Time          22EPIC Rev 08/18

## 2021-05-10 NOTE — PROGRESS NOTES
Pt A&O. VSS. Denies pain. Dressing CDI. Pt and spouse verbalized understanding of discharge instructions.  All belongings returned to pt at discharge. Pt taken out of hospital via pt's electric wheelchair.

## 2021-07-13 ENCOUNTER — MEDICAL CORRESPONDENCE (OUTPATIENT)
Dept: HEALTH INFORMATION MANAGEMENT | Facility: CLINIC | Age: 81
End: 2021-07-13

## 2021-07-15 DIAGNOSIS — Z11.59 ENCOUNTER FOR SCREENING FOR OTHER VIRAL DISEASES: ICD-10-CM

## 2021-08-22 ENCOUNTER — LAB (OUTPATIENT)
Dept: URGENT CARE | Facility: URGENT CARE | Age: 81
End: 2021-08-22
Attending: UROLOGY
Payer: COMMERCIAL

## 2021-08-22 DIAGNOSIS — Z11.59 ENCOUNTER FOR SCREENING FOR OTHER VIRAL DISEASES: ICD-10-CM

## 2021-08-22 PROCEDURE — U0005 INFEC AGEN DETEC AMPLI PROBE: HCPCS

## 2021-08-22 PROCEDURE — U0003 INFECTIOUS AGENT DETECTION BY NUCLEIC ACID (DNA OR RNA); SEVERE ACUTE RESPIRATORY SYNDROME CORONAVIRUS 2 (SARS-COV-2) (CORONAVIRUS DISEASE [COVID-19]), AMPLIFIED PROBE TECHNIQUE, MAKING USE OF HIGH THROUGHPUT TECHNOLOGIES AS DESCRIBED BY CMS-2020-01-R: HCPCS

## 2021-08-23 LAB — SARS-COV-2 RNA RESP QL NAA+PROBE: NEGATIVE

## 2021-08-26 ENCOUNTER — APPOINTMENT (OUTPATIENT)
Dept: INTERVENTIONAL RADIOLOGY/VASCULAR | Facility: CLINIC | Age: 81
End: 2021-08-26
Attending: UROLOGY
Payer: COMMERCIAL

## 2021-08-26 ENCOUNTER — HOSPITAL ENCOUNTER (OUTPATIENT)
Facility: CLINIC | Age: 81
Discharge: HOME | End: 2021-08-26
Attending: RADIOLOGY | Admitting: RADIOLOGY
Payer: COMMERCIAL

## 2021-08-26 VITALS
TEMPERATURE: 98.9 F | DIASTOLIC BLOOD PRESSURE: 89 MMHG | WEIGHT: 115 LBS | HEART RATE: 93 BPM | HEIGHT: 60 IN | SYSTOLIC BLOOD PRESSURE: 132 MMHG | RESPIRATION RATE: 16 BRPM | BODY MASS INDEX: 22.58 KG/M2 | OXYGEN SATURATION: 93 %

## 2021-08-26 DIAGNOSIS — N18.6 ESRD (END STAGE RENAL DISEASE) (H): ICD-10-CM

## 2021-08-26 PROCEDURE — 50435 EXCHANGE NEPHROSTOMY CATH: CPT

## 2021-08-26 PROCEDURE — C1729 CATH, DRAINAGE: HCPCS

## 2021-08-26 PROCEDURE — 250N000011 HC RX IP 250 OP 636

## 2021-08-26 PROCEDURE — 250N000009 HC RX 250

## 2021-08-26 PROCEDURE — C1769 GUIDE WIRE: HCPCS

## 2021-08-26 RX ORDER — LIDOCAINE 40 MG/G
CREAM TOPICAL
Status: DISCONTINUED | OUTPATIENT
Start: 2021-08-26 | End: 2021-08-26 | Stop reason: HOSPADM

## 2021-08-26 RX ORDER — LIDOCAINE HYDROCHLORIDE 10 MG/ML
INJECTION, SOLUTION EPIDURAL; INFILTRATION; INTRACAUDAL; PERINEURAL
Status: COMPLETED
Start: 2021-08-26 | End: 2021-08-26

## 2021-08-26 RX ORDER — CEFTAZIDIME 1 G/1
1 INJECTION, POWDER, FOR SOLUTION INTRAMUSCULAR; INTRAVENOUS
Status: COMPLETED | OUTPATIENT
Start: 2021-08-26 | End: 2021-08-26

## 2021-08-26 RX ORDER — CEFTAZIDIME 1 G/1
INJECTION, POWDER, FOR SOLUTION INTRAMUSCULAR; INTRAVENOUS
Status: COMPLETED
Start: 2021-08-26 | End: 2021-08-26

## 2021-08-26 RX ADMIN — CEFTAZIDIME 1 G: 1 INJECTION, POWDER, FOR SOLUTION INTRAMUSCULAR; INTRAVENOUS at 10:32

## 2021-08-26 RX ADMIN — LIDOCAINE HYDROCHLORIDE 4 ML: 10 INJECTION, SOLUTION EPIDURAL; INFILTRATION; INTRACAUDAL; PERINEURAL at 10:50

## 2021-08-26 ASSESSMENT — MIFFLIN-ST. JEOR: SCORE: 1084.14

## 2021-08-26 NOTE — PROGRESS NOTES
VSS. Dressing CDI. No sedation received. Discharge instructions reviewed with patient and spouse. Patient left in wheelchair with spouse.

## 2021-08-26 NOTE — IP AVS SNAPSHOT
[facility] Interventional Radiology  [address] [facility] [address]  Phone: [phone]  Fax: [phone]                                    After Visit Summary   8/26/2021    Rogelio Trejo    MRN: [id number]           After Visit Summary Signature Page    I have received my discharge instructions, and my questions have been answered. I have discussed any challenges I see with this plan with the nurse or doctor.    ..........................................................................................................................................  Patient/Patient Representative Signature      ..........................................................................................................................................  Patient Representative Print Name and Relationship to Patient    ..................................................               ................................................  Date                                   Time    ..........................................................................................................................................  Reviewed by Signature/Title    ...................................................              ..............................................  Date                                               Time          22EPIC Rev 08/18

## 2021-10-28 DIAGNOSIS — Z11.59 ENCOUNTER FOR SCREENING FOR OTHER VIRAL DISEASES: ICD-10-CM

## 2021-10-28 NOTE — PROGRESS NOTES
Ordering Clinic:  [facility]   IR procedure #:  Procedure: left neph tube exchange  Reason for procedure: uretheral stricture, routine change  Arrival date: 11/17  Arrival time: 0900  Covid-19 testing requirements explained: y  NPO explained: na                 Does patient have transportation available pre and post procedure?   y  Check-in procedure explained: y  Allergies reviewed: nka  Blood thinners: Eliquis  Labs: na  Results:_____________________________________  H&P:  [facility] 10/28/21  H&P requested?:   Letter sent/email?: verbal  Does patient require /language?        PMH:

## 2021-11-15 ENCOUNTER — LAB (OUTPATIENT)
Dept: LAB | Facility: CLINIC | Age: 81
End: 2021-11-15
Attending: UROLOGY
Payer: COMMERCIAL

## 2021-11-15 DIAGNOSIS — Z11.59 ENCOUNTER FOR SCREENING FOR OTHER VIRAL DISEASES: ICD-10-CM

## 2021-11-15 PROCEDURE — U0005 INFEC AGEN DETEC AMPLI PROBE: HCPCS

## 2021-11-16 LAB — SARS-COV-2 RNA RESP QL NAA+PROBE: NEGATIVE

## 2021-11-17 ENCOUNTER — APPOINTMENT (OUTPATIENT)
Dept: INTERVENTIONAL RADIOLOGY/VASCULAR | Facility: CLINIC | Age: 81
End: 2021-11-17
Attending: UROLOGY
Payer: COMMERCIAL

## 2021-11-17 ENCOUNTER — HOSPITAL ENCOUNTER (OUTPATIENT)
Facility: CLINIC | Age: 81
Discharge: HOME | End: 2021-11-17
Attending: RADIOLOGY | Admitting: RADIOLOGY
Payer: COMMERCIAL

## 2021-11-17 VITALS
HEART RATE: 91 BPM | OXYGEN SATURATION: 90 % | TEMPERATURE: 97.2 F | DIASTOLIC BLOOD PRESSURE: 84 MMHG | RESPIRATION RATE: 22 BRPM | SYSTOLIC BLOOD PRESSURE: 130 MMHG

## 2021-11-17 DIAGNOSIS — N35.919 URETHRAL STRICTURE: ICD-10-CM

## 2021-11-17 PROCEDURE — C1729 CATH, DRAINAGE: HCPCS

## 2021-11-17 PROCEDURE — C1769 GUIDE WIRE: HCPCS

## 2021-11-17 PROCEDURE — 50435 EXCHANGE NEPHROSTOMY CATH: CPT

## 2021-11-17 RX ORDER — LIDOCAINE HYDROCHLORIDE 10 MG/ML
INJECTION, SOLUTION EPIDURAL; INFILTRATION; INTRACAUDAL; PERINEURAL
Status: DISCONTINUED
Start: 2021-11-17 | End: 2021-11-17 | Stop reason: HOSPADM

## 2021-11-17 RX ORDER — CEFTAZIDIME 1 G/1
1 INJECTION, POWDER, FOR SOLUTION INTRAMUSCULAR; INTRAVENOUS
Status: DISCONTINUED | OUTPATIENT
Start: 2021-11-17 | End: 2021-11-17 | Stop reason: HOSPADM

## 2021-11-17 NOTE — DISCHARGE INSTRUCTIONS
Percutaneous Nephrostomy  Percutaneous nephrostomy is a procedure where a small tube (catheter) is put through your skin into your kidney to drain your urine. This procedure is done by a specially trained doctor called an interventional radiologist.   Why percutaneous nephrostomy is done  Percutaneous nephrostomy may be needed when a kidney or a tube (ureter) leading from a kidney to your bladder gets blocked. This can happen because of kidney stones, tumors, or another cause. The blockage can cause a backup of urine in the kidney. This procedure is done to stop pain, infection, and kidney damage.     Risks of percutaneous nephrostomy  All procedures have some risks. Possible risks of this procedure include:    Bleeding of your kidney    Blockage of the catheter    Blood infection (sepsis)    Kidney infection    Problems because of the X-ray dye (contrast medium). These include allergic reaction or kidney damage.    Skin infection around the catheter site    The catheter may need to be replaced if it is used for a long time. The same procedure is used.    Urine leak    X-ray radiation exposure, which is considered to be low level and safe   Getting ready for your procedure  Tell your healthcare provider if you:    Are allergic to X-ray dye or other medicines    Are breastfeeding    Are pregnant or think you may be pregnant  Tell your healthcare provider about any recent illnesses, all medical conditions, and all medicines you take. You may need to stop taking some or all of them before your procedure. This includes:     All prescription medicines    Any street drugs you may use    Herbs, vitamins, and other supplements     Over-the-counter medicines that don t need a prescription, including aspirin and ibuprofen   Also be sure to:    Follow any directions you re given for not eating or drinking before your procedure.    Follow any other instructions from your healthcare provider.    Plan to have a relative or  friend drive you home after your procedure. You can t drive yourself.  During your procedure    You will change into a hospital gown.    An IV line is put into your hand or arm to give you fluids and medicines. You will then lie on your stomach on an X-ray table. You may be given medicine to help you relax and make you feel sleepy.    The skin on your lower back is numbed with an injection of local anesthesia.    The radiologist will use CT scan, ultrasound, or fluoroscopy, images as a guide. He or she will insert a needle through your lower back into your kidney. X-ray dye may be injected through this needle into your kidney. This fluid makes your kidney easier to see on X-ray images. The X-ray images can show exactly where your kidney or ureter is blocked.    The needle is then replaced with a thin tube called a drainage catheter. The catheter is attached to a drainage bag. This bag collects the urine that drains from your kidney. The catheter may be stitched (sutured) or taped to your skin. This helps keep it in place and stop it from moving.    The entire procedure takes about 1 to 2 hours. You will remain in the recovery room until you are completely awake and ready to go home.    After your procedure  The catheter will stay in place until the problem that caused the urine buildup is treated. This may be for as little as a day or as long as a few weeks or months. The bag is secured to your leg so you can walk around. During the time the catheter is in place you should:     Keep the skin around the catheter clean and dry.    Be careful not to move or knock the catheter out of place. Make sure that the drainage bag is secured firmly to your leg.    Empty the drainage bag often. This keeps the weight of the bag from pulling on the catheter.    Your healthcare provider will give you detailed instructions on how to care for your catheter and drainage bag.  When to call your healthcare provider  Call your healthcare  provider if your urine becomes cloudy or smells bad, or if you develop fever or chills. Follow all instructions given to you by your healthcare provider.   Michell last reviewed this educational content on 8/1/2020 2000-2021 [employer]. All rights reserved. This information is not intended as a substitute for professional medical care. Always follow your healthcare professional's instructions.

## 2021-11-17 NOTE — IP AVS SNAPSHOT
[facility] Interventional Radiology  [address] [facility] [address]  Phone: [phone]  Fax: [phone]                                  After Visit Summary   11/17/2021    Rogelio Trejo   MRN: [id number]           After Visit Summary Signature Page    I have received my discharge instructions, and my questions have been answered. I have discussed any challenges I see with this plan with the nurse or doctor.    ..........................................................................................................................................  Patient/Patient Representative Signature      ..........................................................................................................................................  Patient Representative Print Name and Relationship to Patient    ..................................................               ................................................  Date                                   Time    ..........................................................................................................................................  Reviewed by Signature/Title    ...................................................              ..............................................  Date                                               Time          22EPIC Rev 08/18

## 2022-01-17 ENCOUNTER — MEDICAL CORRESPONDENCE (OUTPATIENT)
Dept: HEALTH INFORMATION MANAGEMENT | Facility: CLINIC | Age: 82
End: 2022-01-17

## 2022-02-04 DIAGNOSIS — Z11.59 ENCOUNTER FOR SCREENING FOR OTHER VIRAL DISEASES: Primary | ICD-10-CM

## 2022-02-21 ENCOUNTER — LAB (OUTPATIENT)
Dept: LAB | Facility: CLINIC | Age: 82
End: 2022-02-21
Attending: UROLOGY
Payer: COMMERCIAL

## 2022-02-21 DIAGNOSIS — Z11.59 ENCOUNTER FOR SCREENING FOR OTHER VIRAL DISEASES: ICD-10-CM

## 2022-02-21 PROCEDURE — U0005 INFEC AGEN DETEC AMPLI PROBE: HCPCS

## 2022-02-22 LAB — SARS-COV-2 RNA RESP QL NAA+PROBE: NEGATIVE

## 2022-02-23 ENCOUNTER — HOSPITAL ENCOUNTER (OUTPATIENT)
Dept: INTERVENTIONAL RADIOLOGY/VASCULAR | Facility: CLINIC | Age: 82
End: 2022-02-23
Attending: UROLOGY
Payer: COMMERCIAL

## 2022-02-23 ENCOUNTER — HOSPITAL ENCOUNTER (OUTPATIENT)
Facility: CLINIC | Age: 82
Discharge: HOME | End: 2022-02-23
Attending: RADIOLOGY | Admitting: RADIOLOGY
Payer: COMMERCIAL

## 2022-02-23 VITALS
RESPIRATION RATE: 18 BRPM | SYSTOLIC BLOOD PRESSURE: 97 MMHG | OXYGEN SATURATION: 94 % | HEART RATE: 91 BPM | TEMPERATURE: 97.7 F | DIASTOLIC BLOOD PRESSURE: 76 MMHG

## 2022-02-23 DIAGNOSIS — N13.30 HYDRONEPHROSIS: ICD-10-CM

## 2022-02-23 PROCEDURE — C1729 CATH, DRAINAGE: HCPCS

## 2022-02-23 PROCEDURE — 50435 EXCHANGE NEPHROSTOMY CATH: CPT

## 2022-02-23 PROCEDURE — C1769 GUIDE WIRE: HCPCS

## 2022-02-23 PROCEDURE — 250N000009 HC RX 250

## 2022-02-23 RX ORDER — SODIUM CHLORIDE 9 MG/ML
INJECTION, SOLUTION INTRAVENOUS CONTINUOUS
Status: DISCONTINUED | OUTPATIENT
Start: 2022-02-23 | End: 2022-02-24 | Stop reason: HOSPADM

## 2022-02-23 RX ORDER — LIDOCAINE 40 MG/G
CREAM TOPICAL
Status: DISCONTINUED | OUTPATIENT
Start: 2022-02-23 | End: 2022-02-24 | Stop reason: HOSPADM

## 2022-02-23 RX ORDER — CEFTAZIDIME 1 G/1
1 INJECTION, POWDER, FOR SOLUTION INTRAMUSCULAR; INTRAVENOUS
Status: DISCONTINUED | OUTPATIENT
Start: 2022-02-23 | End: 2022-02-24 | Stop reason: HOSPADM

## 2022-02-23 RX ORDER — NALOXONE HYDROCHLORIDE 0.4 MG/ML
0.4 INJECTION, SOLUTION INTRAMUSCULAR; INTRAVENOUS; SUBCUTANEOUS
Status: DISCONTINUED | OUTPATIENT
Start: 2022-02-23 | End: 2022-02-24 | Stop reason: HOSPADM

## 2022-02-23 RX ORDER — FENTANYL CITRATE 50 UG/ML
25-50 INJECTION, SOLUTION INTRAMUSCULAR; INTRAVENOUS EVERY 5 MIN PRN
Status: DISCONTINUED | OUTPATIENT
Start: 2022-02-23 | End: 2022-02-24 | Stop reason: HOSPADM

## 2022-02-23 RX ORDER — FLUMAZENIL 0.1 MG/ML
0.2 INJECTION, SOLUTION INTRAVENOUS
Status: DISCONTINUED | OUTPATIENT
Start: 2022-02-23 | End: 2022-02-24 | Stop reason: HOSPADM

## 2022-02-23 RX ORDER — NALOXONE HYDROCHLORIDE 0.4 MG/ML
0.2 INJECTION, SOLUTION INTRAMUSCULAR; INTRAVENOUS; SUBCUTANEOUS
Status: DISCONTINUED | OUTPATIENT
Start: 2022-02-23 | End: 2022-02-24 | Stop reason: HOSPADM

## 2022-02-23 RX ORDER — LIDOCAINE HYDROCHLORIDE 10 MG/ML
INJECTION, SOLUTION EPIDURAL; INFILTRATION; INTRACAUDAL; PERINEURAL
Status: COMPLETED
Start: 2022-02-23 | End: 2022-02-23

## 2022-02-23 RX ADMIN — LIDOCAINE HYDROCHLORIDE 2 ML: 10 INJECTION, SOLUTION EPIDURAL; INFILTRATION; INTRACAUDAL; PERINEURAL at 10:03

## 2022-02-23 NOTE — PROGRESS NOTES
No sedation given for procedure. A&O, no c/o pain, VSS. Pt declined discharge instructions, verbalizes prior understanding of post procedure care as he has had 3 prior exchanges already. Pt tdischarged with all belongings.

## 2022-02-23 NOTE — PROCEDURES
[facility]    Procedure: Left nephrostomy tube exchange.    Date/Time: 2/23/2022 10:11 AM  Performed by: Cheryl Emerson DO  Authorized by: Cheryl Emerson DO       UNIVERSAL PROTOCOL   Site Marked: Yes  Prior Images Obtained and Reviewed:  Yes  Required items: Required blood products, implants, devices and special equipment available    Patient identity confirmed:  Verbally with patient, arm band, provided demographic data and hospital-assigned identification number  Patient was reevaluated immediately before administering moderate or deep sedation or anesthesia  Confirmation Checklist:  Patient's identity using two indicators, relevant allergies, procedure was appropriate and matched the consent or emergent situation and correct equipment/implants were available  Time out: Immediately prior to the procedure a time out was called    Universal Protocol: the Joint Commission Universal Protocol was followed    Preparation: Patient was prepped and draped in usual sterile fashion       ANESTHESIA    Anesthesia: Local infiltration  Local Anesthetic:  Lidocaine 1% without epinephrine      SEDATION    Patient Sedated: No    See dictated procedure note for full details.  Findings: Left nephrostomy tube exchange.     Specimens: none    Complications: None    Condition: Stable      PROCEDURE  Describe Procedure: Left nephrostomy tube exchange.     Patient refused IV antibiotics.   Patient Tolerance:  Patient tolerated the procedure well with no immediate complications  Length of time physician/provider present for 1:1 monitoring during sedation: 0

## 2022-02-23 NOTE — SEDATION DOCUMENTATION
Post Procedure Summary:  Prior to the start of the procedure and with procedural staff participation, I verbally confirmed the patient s identity using two indicators, relevant allergies, that the procedure was appropriate and matched the consent or emergent situation, and that the correct equipment/implants were available. Immediately prior to starting the procedure I conducted the Time Out with the procedural staff and re-confirmed the patient s name, procedure, and site/side. (The Joint Commission universal protocol was followed.)  Yes       Sedatives: No sedation given    Vital signs, airway and pulse oximetry were monitored and remained stable throughout the procedure and sedation was maintained until the procedure was complete.  The patient was monitored by staff until sedation discharge criteria were met.    Patient tolerance: Patient tolerated the procedure well with no immediate complications.    Time of sedation in minutes: 0 minutes from beginning to end of physician one to one monitoring.

## 2022-04-20 ENCOUNTER — TRANSFERRED RECORDS (OUTPATIENT)
Dept: INTERVENTIONAL RADIOLOGY/VASCULAR | Facility: CLINIC | Age: 82
End: 2022-04-20
Payer: COMMERCIAL

## 2022-05-10 NOTE — PROGRESS NOTES
Ordering Clinic:  EARLENE Oleary        procedure #:  Procedure: Left neph tube exchange  Reason for procedure: urethral stricture  Arrival date: 6/2/2022  Arrival time: 0800  Covid-19 testing requirements explained: Y  NPO explained: NA - no sedation.                  Does patient have transportation available pre and post procedure?  Yes  Check-in procedure explained: Y  Allergies reviewed: NKA  Blood thinners: N  Labs: NA    Results:___________________________  H&P:  NA - no sedation.    H&P requested?:   Letter sent/email?:   Does patient require /language?        PMH: Wheelchair bound. Pt denies the need for sedation and last procedure refused antibiotics.

## 2022-05-25 DIAGNOSIS — Z11.59 ENCOUNTER FOR SCREENING FOR OTHER VIRAL DISEASES: Primary | ICD-10-CM

## 2022-06-02 ENCOUNTER — APPOINTMENT (OUTPATIENT)
Dept: INTERVENTIONAL RADIOLOGY/VASCULAR | Facility: CLINIC | Age: 82
End: 2022-06-02
Attending: UROLOGY
Payer: COMMERCIAL

## 2022-06-02 ENCOUNTER — HOSPITAL ENCOUNTER (OUTPATIENT)
Facility: CLINIC | Age: 82
Discharge: HOME | End: 2022-06-02
Attending: RADIOLOGY | Admitting: RADIOLOGY
Payer: COMMERCIAL

## 2022-06-02 VITALS
HEART RATE: 88 BPM | OXYGEN SATURATION: 91 % | TEMPERATURE: 97.9 F | DIASTOLIC BLOOD PRESSURE: 83 MMHG | WEIGHT: 115 LBS | RESPIRATION RATE: 20 BRPM | SYSTOLIC BLOOD PRESSURE: 116 MMHG | BODY MASS INDEX: 21.71 KG/M2 | HEIGHT: 61 IN

## 2022-06-02 DIAGNOSIS — N35.919 URETHRAL STRICTURE: ICD-10-CM

## 2022-06-02 PROCEDURE — 50435 EXCHANGE NEPHROSTOMY CATH: CPT

## 2022-06-02 PROCEDURE — 250N000009 HC RX 250

## 2022-06-02 PROCEDURE — C1769 GUIDE WIRE: HCPCS

## 2022-06-02 PROCEDURE — C1729 CATH, DRAINAGE: HCPCS

## 2022-06-02 RX ORDER — LIDOCAINE HYDROCHLORIDE 10 MG/ML
INJECTION, SOLUTION EPIDURAL; INFILTRATION; INTRACAUDAL; PERINEURAL
Status: COMPLETED
Start: 2022-06-02 | End: 2022-06-02

## 2022-06-02 RX ORDER — CEFTAZIDIME 1 G/1
1 INJECTION, POWDER, FOR SOLUTION INTRAMUSCULAR; INTRAVENOUS
Status: DISCONTINUED | OUTPATIENT
Start: 2022-06-02 | End: 2022-06-02 | Stop reason: HOSPADM

## 2022-06-02 RX ADMIN — LIDOCAINE HYDROCHLORIDE 3 ML: 10 INJECTION, SOLUTION EPIDURAL; INFILTRATION; INTRACAUDAL; PERINEURAL at 09:21

## 2022-06-02 NOTE — PROGRESS NOTES
VSS. Denies pain. Dressing CDI. Patient did not receive sedation and refused antibiotic so no PIV was placed. Patient declined discharge instructions. Patient left hospital in stable condition in wheelchair.

## 2022-07-28 ENCOUNTER — HOSPITAL ENCOUNTER (OUTPATIENT)
Facility: CLINIC | Age: 82
End: 2022-07-28
Admitting: RADIOLOGY
Payer: COMMERCIAL